# Patient Record
Sex: FEMALE | Race: WHITE | Employment: UNEMPLOYED | URBAN - METROPOLITAN AREA
[De-identification: names, ages, dates, MRNs, and addresses within clinical notes are randomized per-mention and may not be internally consistent; named-entity substitution may affect disease eponyms.]

---

## 2018-10-15 ENCOUNTER — HOSPITAL ENCOUNTER (OUTPATIENT)
Dept: OCCUPATIONAL THERAPY | Age: 58
Setting detail: THERAPIES SERIES
Discharge: HOME | End: 2018-10-15
Payer: OTHER GOVERNMENT

## 2018-10-15 PROCEDURE — 97166 OT EVAL MOD COMPLEX 45 MIN: CPT

## 2018-10-15 PROCEDURE — G8984 CARRY CURRENT STATUS: HCPCS

## 2018-10-15 PROCEDURE — G8985 CARRY GOAL STATUS: HCPCS

## 2018-10-15 PROCEDURE — 97530 THERAPEUTIC ACTIVITIES: CPT

## 2018-10-15 PROCEDURE — 97760 ORTHOTIC MGMT&TRAING 1ST ENC: CPT

## 2018-10-15 NOTE — PROGRESS NOTES
) (    ) (    )       Hand Strength # Right Left     40 62   Lateral Pinch 16 15   Norman Pinch 13 13   Tip Pinch          Edema Right  Left    Hand Volumeter     Circumference: Palm      Wrist Crease     Base of Index     Base of Long     Base of Ring     Base of Small                              Other: Made ext splint at 30 degrees R elbow. To try for a week and see if it helps. Will make splint for left if helpful for patients symptoms. Instructed in stretching flexor tendons R hand. Issued and instructed in ulnar and median nerve gliding. Barriers to Learning:            No barriers noted              Assessment   Assessment  Performance deficits / Impairments: Decreased functional mobility ; Decreased ROM; Decreased strength  Assessment: Pt presents with numbness and tingling in B hands. Pain and discomfort at night from nerve pain in BUE. Flexor tightness in R hand  Treatment Diagnosis: M79.6  Prognosis: Good;Fair  Decision Making: Medium Complexity  History: PMH: Emphysema, smoker, smoker PSH: Fusion of neck   Meds: baclofen, neurontin,  wellbutrim, sprivia  Exam: ROM, strength, sensation  Assistance / Modification: Min  Patient Education: Issued and instructed in HEP  Barriers to Learning: none noted  Treatment Initiated : Fabricated volar elbow splint at 30 degrees to R elbow, issued and instructed in HEP       Plan   Plan  Times per week: 1  Plan weeks: 2-4  Current Treatment Recommendations: ROM, Patient/Caregiver Education & Training  Plan Comment: Splinting    G-Code  OT G-codes  Functional Assessment Tool Used: Juliana Sunshine  Score: 34.09  Functional Limitation: Carrying, moving and handling objects  Carrying, Moving and Handling Objects Current Status (): At least 40 percent but less than 60 percent impaired, limited or restricted  Carrying, Moving and Handling Objects Goal Status ():  At least 1 percent but less than 20 percent impaired, limited or restricted  OutComes Score  QUICK DASH

## 2019-06-20 ENCOUNTER — HOSPITAL ENCOUNTER (OUTPATIENT)
Age: 59
Setting detail: SPECIMEN
Discharge: HOME | End: 2019-06-20
Payer: OTHER GOVERNMENT

## 2019-06-20 PROCEDURE — 87086 URINE CULTURE/COLONY COUNT: CPT

## 2019-06-20 PROCEDURE — 87186 SC STD MICRODIL/AGAR DIL: CPT

## 2019-06-20 PROCEDURE — 87077 CULTURE AEROBIC IDENTIFY: CPT

## 2019-06-23 LAB
CULTURE: ABNORMAL
Lab: ABNORMAL
SPECIMEN: ABNORMAL
TOTAL COLONY COUNT: ABNORMAL

## 2021-06-21 ENCOUNTER — NURSE ONLY (OUTPATIENT)
Dept: SURGERY | Age: 61
End: 2021-06-21
Payer: OTHER GOVERNMENT

## 2021-06-21 ENCOUNTER — HOSPITAL ENCOUNTER (OUTPATIENT)
Age: 61
Setting detail: SPECIMEN
Discharge: HOME | End: 2021-06-21
Payer: OTHER GOVERNMENT

## 2021-06-21 ENCOUNTER — OFFICE VISIT (OUTPATIENT)
Dept: GASTROENTEROLOGY | Age: 61
End: 2021-06-21
Payer: OTHER GOVERNMENT

## 2021-06-21 ENCOUNTER — TELEPHONE (OUTPATIENT)
Dept: GASTROENTEROLOGY | Age: 61
End: 2021-06-21

## 2021-06-21 VITALS
OXYGEN SATURATION: 98 % | HEART RATE: 97 BPM | HEIGHT: 65 IN | BODY MASS INDEX: 23.49 KG/M2 | WEIGHT: 141 LBS | DIASTOLIC BLOOD PRESSURE: 74 MMHG | SYSTOLIC BLOOD PRESSURE: 122 MMHG

## 2021-06-21 VITALS — SYSTOLIC BLOOD PRESSURE: 142 MMHG | DIASTOLIC BLOOD PRESSURE: 86 MMHG | TEMPERATURE: 98 F

## 2021-06-21 DIAGNOSIS — Z01.818 PRE-OP TESTING: ICD-10-CM

## 2021-06-21 DIAGNOSIS — R09.89 GLOBUS SENSATION: Primary | ICD-10-CM

## 2021-06-21 DIAGNOSIS — C18.1 CANCER OF APPENDIX (HCC): ICD-10-CM

## 2021-06-21 DIAGNOSIS — Z01.818 PRE-OP TESTING: Primary | ICD-10-CM

## 2021-06-21 PROCEDURE — 99204 OFFICE O/P NEW MOD 45 MIN: CPT | Performed by: SPECIALIST

## 2021-06-21 PROCEDURE — U0005 INFEC AGEN DETEC AMPLI PROBE: HCPCS

## 2021-06-21 PROCEDURE — U0003 INFECTIOUS AGENT DETECTION BY NUCLEIC ACID (DNA OR RNA); SEVERE ACUTE RESPIRATORY SYNDROME CORONAVIRUS 2 (SARS-COV-2) (CORONAVIRUS DISEASE [COVID-19]), AMPLIFIED PROBE TECHNIQUE, MAKING USE OF HIGH THROUGHPUT TECHNOLOGIES AS DESCRIBED BY CMS-2020-01-R: HCPCS

## 2021-06-21 PROCEDURE — 99211 OFF/OP EST MAY X REQ PHY/QHP: CPT | Performed by: SPECIALIST

## 2021-06-21 RX ORDER — ESOMEPRAZOLE MAGNESIUM 40 MG/1
40 FOR SUSPENSION ORAL DAILY
COMMUNITY

## 2021-06-21 RX ORDER — BACLOFEN 500 UG/ML
25 INJECTION, SOLUTION INTRATHECAL
COMMUNITY

## 2021-06-21 RX ORDER — MONTELUKAST SODIUM 10 MG/1
10 TABLET ORAL NIGHTLY
COMMUNITY

## 2021-06-21 NOTE — PATIENT INSTRUCTIONS
Pre-Procedure COVID-19 Self Testing  Quarantine Instructions  Day of Surgery Instructions         What to do before my surgery:    All patients scheduled for elective surgery must test for COVID19 72-96 hours prior to the surgery date.  Pre-Procedure COVID-19 Self-Test will be scheduled for you by your provider.  You can receive your Pre-Procedure COVID-19 Self-Test at:  [facility]. [address] [facility], [facility], [facility], [address]   If you do not have the COVID-19 test we will cancel or reschedule your procedure   Once you test you must quarantine at home until after your procedure with only your immediate family members or whoever lives with you.  If you must work during your quarantine period, we ask that you continue to practice social distancing, wear a mask that covers your mouth and nose and perform all hand hygiene as recommended by the CDC.  If you must go to the grocery, etc. and cannot get someone to do this for you please wear a mask that covers your mouth and nose and perform all hand hygiene as recommended by the CDC.  Your surgeon's office will notify you with any concerns about your test result. What can I expect on the day of surgery?  Arrive at the time the office or hospital staff tell you on the day of your procedure.  Wear a mask when entering the hospital.     A member of the hospital staff will take your temperature and ask you a few questions as you enter the building.  In abundance of caution for the safety of all our patients and staff, please follow all hospital visitor guidelines in place at the time of your procedure. The staff caring for you will stay in close communication with your loved one and keep them updated on progress.  Please provide a phone number for us to use when communicating with your family or ride home.    When you are ready to discharge, we will notify your family/person with you to bring the car to the front entrance. We will take you to them after you receive all of your discharge instructions.

## 2021-06-21 NOTE — PROGRESS NOTES
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 Last colonoscopy was 2 years ago and repeat was recommended in 5 years. She was referred now because of \"tightness  In throat\" associated with SOB - last for about 1 minute. Is able to swallow when it happens. Gets the episodes 4-5 times per day. Takes Nexium currently- omeprazole before that. No dysphagia. No vomiting, abd pain, diarrhea, constipation, melena, hematochezia, weight loss. Drinks 6 beers per week   Has COPD - on Advair, Spiriva, Albuterol    Past Medical History:        Diagnosis Date    Cervical myelopathy ([address]     Cervical spinal cord injury without evidence of spinal bone injury ([address]     Emphysema of lung ([address]     GERD (gastroesophageal reflux disease)        Past Surgical History:        Procedure Laterality Date    APPENDECTOMY      tumor found in appendix    COLON SURGERY      small intestine removal    LYMPH NODE DISSECTION      13 lymph nodes removed    OVARY REMOVAL      removed with small bowel area and lymph d/t tumor as preventative       Medications:   Prior to Admission medications    Medication Sig Start Date End Date Taking?  Authorizing Provider   tiotropium (SPIRIVA) 18 MCG inhalation capsule Inhale 1 capsule into the lungs daily 2/5/16   C Deborah Fink MD   gabapentin (NEURONTIN) 100 MG capsule Take 2 capsules by mouth 4 times daily (before meals and nightly) 2/5/16   MAYRA Fink MD   senna-docusate (PERICOLACE) 8.6-50 MG per tablet Take 1 tablet by mouth nightly 2/5/16   MAYRA Fink MD   ALBUTEROL IN Inhale into the lungs as needed    Historical Provider, MD   Omeprazole (PRILOSEC PO) Take by mouth    Historical Provider, MD       Allergies: Allergies   Allergen Reactions    Septra [Sulfamethoxazole-Trimethoprim] Hives   . Family History:   Family History   Problem Relation Age of Onset    Diabetes Brother        ROS: see written ROS sheet in soft chart    PHYSICAL EXAM:    Vitals: Wt 141 lb (64 kg)   BMI 23.46 kg/m²   CONSTITUTIONAL: alert, cooperative, no apparent distress,   EYES:  pupils equal, round and reactive to light and sclera clear  ENT:  normocepalic, without obvious abnormality  NECK:  supple, symmetrical, trachea midline  HEMATOLOGIC/LYMPHATICS:  no cervical lymphadenopathy and no supraclavicular lymphadenopathy  LUNGS:  clear to auscultation  CARDIOVASCULAR:  regular rate and rhythm and no murmur noted  ABDOMEN:  normal bowel sounds, soft, non-distended, non-tender and no masses palpated, no hepatosplenomegally  NEUROLOGIC: no focal deficit detected  SKIN:  no lesions  EXTREMITIES: no clubbing, cyanosis, or edema    DATA:      CBC:    Lab Results   Component Value Date    WBC 6.3 02/03/2016    HGB 12.3 02/03/2016    MCV 94.5 02/03/2016     02/03/2016     CMP:    Lab Results   Component Value Date     02/03/2016    K 4.1 02/03/2016     02/03/2016    CO2 26 02/03/2016    BUN 11 02/03/2016    CREATININE 0.5 02/03/2016    GFRAA >60 02/03/2016    LABGLOM >60 02/03/2016    GLUCOSE 132 02/03/2016    CALCIUM 9.5 02/03/2016         IMPRESSION:  1) GERD  2) history of appendiceal cancer- gets follow up at [facility]  3) \"tightness in throat\"-- ? Globus sensation    RECOMMENDATIONS:  1) EGD- increased risk (ASA3) due to COPD  2) if EGD negative consider anti-anxiety Rx  3) repeat colonoscopy in 3 years per [facility]        Dave Sevilla M.D.

## 2021-06-21 NOTE — TELEPHONE ENCOUNTER
Per my call to [facility] automated; No auth needed for C-scope. We do have a referral which is needed to bill.

## 2021-06-22 ENCOUNTER — ANESTHESIA EVENT (OUTPATIENT)
Dept: OPERATING ROOM | Age: 61
End: 2021-06-22
Payer: OTHER GOVERNMENT

## 2021-06-22 LAB
SARS-COV-2: NOT DETECTED
SOURCE: NORMAL

## 2021-06-22 ASSESSMENT — LIFESTYLE VARIABLES: SMOKING_STATUS: 1

## 2021-06-22 NOTE — ANESTHESIA PRE PROCEDURE
mg by mouth nightly      tiotropium (SPIRIVA) 18 MCG inhalation capsule Inhale 1 capsule into the lungs daily 30 capsule 0    gabapentin (NEURONTIN) 100 MG capsule Take 2 capsules by mouth 4 times daily (before meals and nightly) 120 capsule 0    senna-docusate (PERICOLACE) 8.6-50 MG per tablet Take 1 tablet by mouth nightly (Patient not taking: Reported on 6/21/2021)      ALBUTEROL IN Inhale into the lungs as needed      Omeprazole (PRILOSEC PO) Take by mouth (Patient not taking: Reported on 6/21/2021)         Allergies: Allergies   Allergen Reactions    Septra [Sulfamethoxazole-Trimethoprim] Hives       Problem List:    Patient Active Problem List   Diagnosis Code    Cervical myelopathy (HCC) G95.9    Quadriparesis ([address] G82.50    Gait disturbance R26.9    Spinal cord injury to cervical region without bone injury ([address] S14.109A       Past Medical History:        Diagnosis Date    Cervical myelopathy ([address]     Cervical spinal cord injury without evidence of spinal bone injury ([address]     Emphysema of lung ([address]     GERD (gastroesophageal reflux disease)        Past Surgical History:        Procedure Laterality Date    APPENDECTOMY      tumor found in appendix    COLON SURGERY      small intestine removal    LYMPH NODE DISSECTION      13 lymph nodes removed    OVARY REMOVAL      removed with small bowel area and lymph d/t tumor as preventative       Social History:    Social History     Tobacco Use    Smoking status: Current Every Day Smoker     Packs/day: 1.00     Years: 40.00     Pack years: 40.00     Types: Cigarettes   Substance Use Topics    Alcohol use: Yes     Comment: 18 cans of beer/wk                                Ready to quit: Not Answered  Counseling given: Not Answered      Vital Signs (Current): There were no vitals filed for this visit.                                            BP Readings from Last 3 Encounters:   06/21/21 (!) 142/86   06/21/21 122/74   02/05/16 112/64 Anesthesia Plan      MAC     ASA 2       Induction: intravenous. Anesthetic plan and risks discussed with patient. VIANEY Castelan CRNA   6/22/2021        Pre Anesthesia Evaluation complete. Anesthesia plan, risks, benefits, alternatives, and personnel discussed with patient and/or legal guardian. Patient and/or legal guardian verbalized an understanding and agreed to proceed. Anesthesia plan discussed with care team members and agreed upon.   VIANEY Castelan CRNA  6/24/2021

## 2021-06-23 RX ORDER — TELMISARTAN 20 MG/1
20 TABLET ORAL DAILY
COMMUNITY

## 2021-06-23 NOTE — PROGRESS NOTES
Spoke with pt. Via telephone. Pt. will arrive at [facility] at 0930 on 6/24/21. Pt.informed that they may have one visitor come with them. The visitor must be free of covid symptoms. Both of them must wear a mask upon entering the hospital.  If they do not have a mask, one will be given to them at the . The masks must be worn the whole time they are in the building. Pt. Will be NPO after MN tonight, including no gum, candy, or nicotine products. Pt. will take instructed medications with a tiny sip of water the morning of the procedure. Pt. will shower with soap and water and will not use any lotions, creams, ointments on their skin. Pt. will wear no jewelry or metal.  Covid-19 test was completed on 6/21/21  and results are negative. Pt. Has had no cough, sore throat, fever, or any other unusual s/s that the physician should be made aware of before surgery. Pt. Had no further questions and/or concerns at this time. SDS phone number was given should any further questions arise. [phone].

## 2021-06-24 ENCOUNTER — ANESTHESIA (OUTPATIENT)
Dept: OPERATING ROOM | Age: 61
End: 2021-06-24
Payer: OTHER GOVERNMENT

## 2021-06-24 ENCOUNTER — HOSPITAL ENCOUNTER (OUTPATIENT)
Age: 61
Setting detail: OUTPATIENT SURGERY
Discharge: HOME | End: 2021-06-24
Attending: SPECIALIST | Admitting: SPECIALIST
Payer: OTHER GOVERNMENT

## 2021-06-24 VITALS
OXYGEN SATURATION: 100 % | TEMPERATURE: 97.4 F | HEART RATE: 67 BPM | SYSTOLIC BLOOD PRESSURE: 123 MMHG | RESPIRATION RATE: 16 BRPM | WEIGHT: 140 LBS | HEIGHT: 65 IN | BODY MASS INDEX: 23.32 KG/M2 | DIASTOLIC BLOOD PRESSURE: 65 MMHG

## 2021-06-24 VITALS — SYSTOLIC BLOOD PRESSURE: 109 MMHG | OXYGEN SATURATION: 99 % | DIASTOLIC BLOOD PRESSURE: 62 MMHG

## 2021-06-24 PROCEDURE — 3700000001 HC ADD 15 MINUTES (ANESTHESIA): Performed by: SPECIALIST

## 2021-06-24 PROCEDURE — 7100000011 HC PHASE II RECOVERY - ADDTL 15 MIN: Performed by: SPECIALIST

## 2021-06-24 PROCEDURE — 7100000010 HC PHASE II RECOVERY - FIRST 15 MIN: Performed by: SPECIALIST

## 2021-06-24 PROCEDURE — 2709999900 HC NON-CHARGEABLE SUPPLY: Performed by: SPECIALIST

## 2021-06-24 PROCEDURE — 2580000003 HC RX 258: Performed by: SPECIALIST

## 2021-06-24 PROCEDURE — 3700000000 HC ANESTHESIA ATTENDED CARE: Performed by: SPECIALIST

## 2021-06-24 PROCEDURE — 43239 EGD BIOPSY SINGLE/MULTIPLE: CPT | Performed by: SPECIALIST

## 2021-06-24 PROCEDURE — 6360000002 HC RX W HCPCS: Performed by: NURSE ANESTHETIST, CERTIFIED REGISTERED

## 2021-06-24 PROCEDURE — 88305 TISSUE EXAM BY PATHOLOGIST: CPT | Performed by: PATHOLOGY

## 2021-06-24 PROCEDURE — 87077 CULTURE AEROBIC IDENTIFY: CPT

## 2021-06-24 PROCEDURE — 3609012400 HC EGD TRANSORAL BIOPSY SINGLE/MULTIPLE: Performed by: SPECIALIST

## 2021-06-24 RX ORDER — SODIUM CHLORIDE, SODIUM LACTATE, POTASSIUM CHLORIDE, CALCIUM CHLORIDE 600; 310; 30; 20 MG/100ML; MG/100ML; MG/100ML; MG/100ML
INJECTION, SOLUTION INTRAVENOUS CONTINUOUS
Status: DISCONTINUED | OUTPATIENT
Start: 2021-06-24 | End: 2021-06-24 | Stop reason: HOSPADM

## 2021-06-24 RX ORDER — LIDOCAINE HYDROCHLORIDE 20 MG/ML
INJECTION, SOLUTION INTRAVENOUS PRN
Status: DISCONTINUED | OUTPATIENT
Start: 2021-06-24 | End: 2021-06-24 | Stop reason: SDUPTHER

## 2021-06-24 RX ORDER — PROPOFOL 10 MG/ML
INJECTION, EMULSION INTRAVENOUS PRN
Status: DISCONTINUED | OUTPATIENT
Start: 2021-06-24 | End: 2021-06-24 | Stop reason: SDUPTHER

## 2021-06-24 RX ADMIN — SODIUM CHLORIDE, POTASSIUM CHLORIDE, SODIUM LACTATE AND CALCIUM CHLORIDE: 600; 310; 30; 20 INJECTION, SOLUTION INTRAVENOUS at 10:11

## 2021-06-24 RX ADMIN — PROPOFOL 230 MG: 10 INJECTION, EMULSION INTRAVENOUS at 11:27

## 2021-06-24 RX ADMIN — LIDOCAINE HYDROCHLORIDE 100 MG: 20 INJECTION, SOLUTION INTRAVENOUS at 11:27

## 2021-06-24 ASSESSMENT — PAIN SCALES - GENERAL: PAINLEVEL_OUTOF10: 0

## 2021-06-24 ASSESSMENT — PAIN - FUNCTIONAL ASSESSMENT: PAIN_FUNCTIONAL_ASSESSMENT: 0-10

## 2021-06-24 NOTE — FLOWSHEET NOTE
Returned to room C. Report received from [facility]. Alert and oriented. Respirations even and unlabored. Color pink. Abdomen soft and nontender. Bowel sounds present. Beverage provided. Call light in reach.

## 2021-06-24 NOTE — H&P
Original H &P in soft chart. I have examined the patient immediately before the procedure and there is no change in the previous history and physical exam, which has been reviewed. There is no history of sleep apnea, snoring, or stridor. There has been no  previous adverse experience with sedation/anesthesia. There is no increased risk for aspiration of gastric contents. The patient has been instructed that all resuscitative measures (during the operative and immediate perioperative period) will be instituted in the unlikely event that they will be needed. The patient has no pertinent past surgical or family history other than listed in the original H&P. The patient was counseled about the risks of sandra Covid-19 during their perioperative period and any recovery window from their procedure. The patient was made aware that sandra Covid-19  may worsen their prognosis for recovering from their procedure  and lend to a higher morbidity and/or mortality risk. All material risks, benefits, and reasonable alternatives including postponing the procedure were discussed. The patient does wish to proceed with the procedure at this time.     ASA Class: 2  AIRWAY Class: 1

## 2021-06-25 LAB — CLOTEST: NEGATIVE

## 2021-07-07 ENCOUNTER — TELEPHONE (OUTPATIENT)
Dept: GASTROENTEROLOGY | Age: 61
End: 2021-07-07

## 2021-07-14 ENCOUNTER — TELEPHONE (OUTPATIENT)
Dept: GASTROENTEROLOGY | Age: 61
End: 2021-07-14

## 2021-09-15 ENCOUNTER — TELEPHONE (OUTPATIENT)
Dept: GASTROENTEROLOGY | Age: 61
End: 2021-09-15

## 2021-09-15 NOTE — TELEPHONE ENCOUNTER
Received requested back dated referral for EGD from [facility]. Patients  is aware this has been done as he requested.

## (undated) DEVICE — TUBING, SUCTION, 3/16" X 6', STRAIGHT: Brand: MEDLINE

## (undated) DEVICE — FORCEPS BX L240CM JAW DIA2.8MM L CAP W/ NDL MIC MESH TOOTH

## (undated) DEVICE — LINER SUCT CANSTR 1500CC SEMI RIG W/ POR HYDROPHOBIC SHUT

## (undated) DEVICE — JELLY LUBRICATING 3 GM BACTERIOSTATIC

## (undated) DEVICE — Z DISCONTINUED (USE MFG CAT MVABO)  TUBING GAS SAMPLING STD 6.5 FT FEMALE CONN SMRT CAPNOLINE

## (undated) DEVICE — TUBING, SUCTION, 9/32" X 10', STRAIGHT: Brand: MEDLINE

## (undated) DEVICE — BRUSH CLN DIA5MM NYL SGL END CBL ASST FLEX DSTL TIP POLYPR

## (undated) DEVICE — YANKAUER,FLEXIBLE HANDLE,REGLR CAPACITY: Brand: MEDLINE INDUSTRIES, INC.